# Patient Record
Sex: MALE | Race: OTHER | HISPANIC OR LATINO | URBAN - METROPOLITAN AREA
[De-identification: names, ages, dates, MRNs, and addresses within clinical notes are randomized per-mention and may not be internally consistent; named-entity substitution may affect disease eponyms.]

---

## 2021-09-10 ENCOUNTER — EMERGENCY (EMERGENCY)
Facility: HOSPITAL | Age: 41
LOS: 1 days | Discharge: HOME | End: 2021-09-10
Attending: EMERGENCY MEDICINE
Payer: SELF-PAY

## 2021-09-10 VITALS
SYSTOLIC BLOOD PRESSURE: 155 MMHG | TEMPERATURE: 98 F | HEART RATE: 92 BPM | DIASTOLIC BLOOD PRESSURE: 67 MMHG | HEIGHT: 67 IN | OXYGEN SATURATION: 97 % | RESPIRATION RATE: 18 BRPM | WEIGHT: 199.96 LBS

## 2021-09-10 VITALS
RESPIRATION RATE: 18 BRPM | HEART RATE: 80 BPM | DIASTOLIC BLOOD PRESSURE: 95 MMHG | TEMPERATURE: 98 F | OXYGEN SATURATION: 97 % | SYSTOLIC BLOOD PRESSURE: 147 MMHG

## 2021-09-10 PROCEDURE — 70450 CT HEAD/BRAIN W/O DYE: CPT | Mod: MA

## 2021-09-10 PROCEDURE — 96372 THER/PROPH/DIAG INJ SC/IM: CPT

## 2021-09-10 PROCEDURE — 99284 EMERGENCY DEPT VISIT MOD MDM: CPT

## 2021-09-10 PROCEDURE — 99284 EMERGENCY DEPT VISIT MOD MDM: CPT | Mod: 25

## 2021-09-10 PROCEDURE — 82962 GLUCOSE BLOOD TEST: CPT

## 2021-09-10 PROCEDURE — 70450 CT HEAD/BRAIN W/O DYE: CPT | Mod: 26,MA

## 2021-09-10 RX ORDER — SODIUM CHLORIDE 9 MG/ML
1000 INJECTION INTRAMUSCULAR; INTRAVENOUS; SUBCUTANEOUS ONCE
Refills: 0 | Status: DISCONTINUED | OUTPATIENT
Start: 2021-09-10 | End: 2021-09-10

## 2021-09-10 RX ORDER — KETOROLAC TROMETHAMINE 30 MG/ML
15 SYRINGE (ML) INJECTION ONCE
Refills: 0 | Status: DISCONTINUED | OUTPATIENT
Start: 2021-09-10 | End: 2021-09-10

## 2021-09-10 RX ORDER — ACETAMINOPHEN 500 MG
975 TABLET ORAL ONCE
Refills: 0 | Status: COMPLETED | OUTPATIENT
Start: 2021-09-10 | End: 2021-09-10

## 2021-09-10 RX ADMIN — Medication 15 MILLIGRAM(S): at 22:17

## 2021-09-10 RX ADMIN — Medication 975 MILLIGRAM(S): at 22:17

## 2021-09-10 NOTE — ED PROVIDER NOTE - NSFOLLOWUPINSTRUCTIONS_ED_ALL_ED_FT
Your CT scan was normal.     Bell’s palsy is a condition in which the muscles on one side of the face become paralyzed. This often causes one side of the face to droop. It is a common condition and many people recover completely. Causes include viral infections but most of the time the reason remains unknown. Signs and symptoms include not being able to raise your eyebrow, not being able to close your eye, drooping of the eyelid and corner of the mouth, sensitivity to loud noises, dryness of the eye, change in taste, and not being able to close your mouth and drooling. Take medicines only as directed by your health care provider. If your eye is affected, use moisturizing eye drops to prevent drying of your eye and tape your eyelid shut at night.    SEEK IMMEDIATE MEDICAL CARE IF YOU HAVE ANY OF THE FOLLOWING SYMPTOMS: weakness or numbness in another part of your body, difficulty swallowing, fever, or neck pain.    Please return to the Emergency Department if you experience any of the following symptoms:   - Shortness of breath or trouble breathing  - Pressure, pain or tightness in the chest  - Face drooping, arm weakness or speech difficulty  - Persistence of severe vomiting  - Head injury or loss of consciousness  - Nonstop bleeding or an open wound    (1) Follow up with your primary care physician within the next 24-48 hours as discussed. In addition, we did not find evidence of a life threatening illness on your testing here today, but listed below are the specialists that will be necessary to see as an outpatient to continue the workup.  Please call the numbers listed below or [phone] to set up the necessary appointments.  (2) Take Tylenol (up to 1000mg or 1 g) or Motrin 600mg up to every 6 hours as needed for pain.   (3) If you had an IV (intravenous) line placed, it was removed. Sometimes, after IV removal, that area can be tender for a few days; if it develops redness and swelling, those could be signs of infection; in which case, return to the Emergency Department for assessment.  (4) Please continue taking all of your home medications as directed.

## 2021-09-10 NOTE — ED PROVIDER NOTE - CLINICAL SUMMARY MEDICAL DECISION MAKING FREE TEXT BOX
40 M w/no PMH presenting with R sided facial droop and headache. Dx with [place] palsy at [facility] and started on prednisone 60mg QD and Valtrex 1g TID yesterday. Pt in today because of worsening headache and facial droop. 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 No weakness on exam. Normal gait. Can speak and swallow. Most likely headache 2/2 bells palsy but do to worsening nature of headache will get CT head to rule out ischemic/hemorrhagic stroke and give acetaminophen 975 mg for pain. IM Toradol if CT scan negative. Advise patient to continue taking prednisone and valtrex as prescribed and symptomatic treatment (ibuprofen/acetaminophen) PRN for headache.

## 2021-09-10 NOTE — ED PROVIDER NOTE - CRANIAL NERVE AND PUPILLARY EXAM
Facial nerve 7 dysfunction. Other cranial nerves intact/CRANIAL NERVES NOT INTACT R CN 7 dysfunction. Other cranial nerves intact/CRANIAL NERVES NOT INTACT

## 2021-09-10 NOTE — ED PROVIDER NOTE - PROGRESS NOTE DETAILS
Sugey Mancini (DO) MDM: 41 y/o M w/ no PMH presents for evaluation of headache and worsening R facial droop. Pt was diagnosed w/ bell's palsy yesterday at outside hospital. Today is day 3 of facial droop, headache since yesterday. Pt was prescribed steroids and Acyclovir, which he has been taking. No medications taken for headache. Pt has R facial droop on exam which is characteristic of bell's palsy. No extremity weakness or other focal neurological deficits. Denies fever. No nuchal rigidity. No trauma. Symptoms most likely due to bell's palsy, +/- viral infection. Plan for meds, imaging and reevaluation. Fabiola Turk MD (PGY2) -  Pt seen & reassessed.  Pt symptomatically improved.  NAD, VSS, pt ambulated w/steady unassisted gait in the ED.  We discussed the results of ED w/u w/patient (incl. presumptive Emergency Department dx, associated anticipatory guidance, stressing importance of prompt f/u, return precautions), & gave them a copy of results.  Patient verbalized understanding of ED course & agreed with our f/u recommendations, has decisional making capacity.  Pt st they will f/u w/PMD within the next 3 days; pt agrees to call today or tomorrow for an appointment. Pt agrees to return to the ED if there is any worsening or concerning symptoms. Sugey Mancini (DO) MDM: 41 y/o M w/ no PMH presents for evaluation of headache and worsening R facial droop. Pt was diagnosed w/ bell's palsy yesterday at outside hospital. Today is day 3 of facial droop, headache since yesterday. Pt was prescribed steroids and Valacyclovir, which he has been taking. No medications taken for headache. Pt has R facial droop on exam including forehead, characteristic of bell's palsy. No extremity weakness or other focal neurological deficits. Denies fever. No nuchal rigidity. No trauma. Symptoms most likely due to bell's palsy. Do not suspect stroke, intracranial mass lesion, meningitis. Plan for meds, imaging and reevaluation.

## 2021-09-10 NOTE — ED PROVIDER NOTE - OBJECTIVE STATEMENT
40 M with no PMH presenting to ED with R sided facial drop and R sided headache. Pt was diagnosed with bells palsy yesterday at [facility] and was Rx prednisone 60mg QD and Valtrex 1g TID. Pt coming to ED today for worsening of headache and facial drop. Pt states the Rt sided headache began 3 days ago and has been a constant 7/10 pain that has been worsening since this morning. Also states the facial drop has been worsening to the point that it is now causing blurry vision of his Rt eye. Nothing alleviates or aggravates sx. pt has not taken any medications for sx besides what was Rx at [facility] yesterday. Denies fever, weakness, neck pain, chest pain, palpitations, SOB, N/V/D.

## 2021-09-10 NOTE — ED ADULT TRIAGE NOTE - CHIEF COMPLAINT QUOTE
dx with bells palsy ysdy; R sided facial droop; no imaging done @ hospital; also c/o HA  symptoms started thursday

## 2021-09-10 NOTE — ED PROVIDER NOTE - PATIENT PORTAL LINK FT
You can access the FollowMyHealth Patient Portal offered by [facility] by registering at the following website: [email or web address]. By joining [employer]’s FollowMyHealth portal, you will also be able to view your health information using other applications (apps) compatible with our system.

## 2021-09-10 NOTE — ED ADULT NURSE REASSESSMENT NOTE - NS ED NURSE REASSESS COMMENT FT1
Received report from ESTHER Jimenez. Pt is awake and alert, resting comfortably in stretcher, speaking in full coherent sentences. Pt denies CP, SOB, fevers, chills, N/V/D, HA, vision changes. Call bell within reach, comfort & safety provided.
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
## 2023-08-17 NOTE — ED PROVIDER NOTE - ATTENDING WITH...
Itraconazole Pregnancy And Lactation Text: This medication is Pregnancy Category C and it isn't know if it is safe during pregnancy. It is also excreted in breast milk. Resident/Student
